# Patient Record
Sex: FEMALE | Race: BLACK OR AFRICAN AMERICAN | NOT HISPANIC OR LATINO | URBAN - METROPOLITAN AREA
[De-identification: names, ages, dates, MRNs, and addresses within clinical notes are randomized per-mention and may not be internally consistent; named-entity substitution may affect disease eponyms.]

---
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
## 2021-05-28 ENCOUNTER — RECORDS - HEALTHEAST (OUTPATIENT)
Dept: ADMINISTRATIVE | Facility: CLINIC | Age: 21
End: 2021-05-28

## 2022-06-29 ENCOUNTER — HOSPITAL ENCOUNTER (EMERGENCY)
Facility: CLINIC | Age: 22
Discharge: HOME | End: 2022-06-29
Attending: EMERGENCY MEDICINE | Admitting: EMERGENCY MEDICINE
Payer: COMMERCIAL

## 2022-06-29 ENCOUNTER — TELEPHONE (OUTPATIENT)
Dept: FAMILY MEDICINE | Facility: CLINIC | Age: 22
End: 2022-06-29

## 2022-06-29 VITALS
HEART RATE: 110 BPM | RESPIRATION RATE: 16 BRPM | TEMPERATURE: 98.2 F | DIASTOLIC BLOOD PRESSURE: 86 MMHG | SYSTOLIC BLOOD PRESSURE: 129 MMHG | OXYGEN SATURATION: 100 %

## 2022-06-29 DIAGNOSIS — U07.1 INFECTION DUE TO 2019 NOVEL CORONAVIRUS: ICD-10-CM

## 2022-06-29 LAB
FLUAV RNA SPEC QL NAA+PROBE: NEGATIVE
FLUBV RNA RESP QL NAA+PROBE: NEGATIVE
RSV RNA SPEC NAA+PROBE: NEGATIVE
SARS-COV-2 RNA RESP QL NAA+PROBE: POSITIVE

## 2022-06-29 PROCEDURE — 87637 SARSCOV2&INF A&B&RSV AMP PRB: CPT | Performed by: EMERGENCY MEDICINE

## 2022-06-29 PROCEDURE — 99283 EMERGENCY DEPT VISIT LOW MDM: CPT

## 2022-06-29 PROCEDURE — C9803 HOPD COVID-19 SPEC COLLECT: HCPCS

## 2022-06-29 RX ORDER — ONDANSETRON 4 MG/1
4 TABLET, ORALLY DISINTEGRATING ORAL EVERY 8 HOURS PRN
Qty: 10 TABLET | Refills: 0 | Status: SHIPPED | OUTPATIENT
Start: 2022-06-29 | End: 2022-07-02

## 2022-06-29 NOTE — DISCHARGE INSTRUCTIONS
Expect to feel achy for the next few days.  Take Tylenol every 4 hours help with symptoms.  Take Zofran to help with any nausea.  Isolate for 5 more days to avoid spreading the infection to others to the

## 2022-06-29 NOTE — ED TRIAGE NOTES
Pt presents with cough, headache and sore throat since Monday. ABCs intact.      Triage Assessment     Row Name 06/29/22 1314       Triage Assessment (Adult)    Airway WDL WDL       Respiratory WDL    Respiratory WDL WDL       Skin Circulation/Temperature WDL    Skin Circulation/Temperature WDL WDL       Cardiac WDL    Cardiac WDL WDL       Peripheral/Neurovascular WDL    Peripheral Neurovascular WDL WDL       Cognitive/Neuro/Behavioral WDL    Cognitive/Neuro/Behavioral WDL WDL

## 2022-06-29 NOTE — TELEPHONE ENCOUNTER
Reason for call:  Other   Patient called regarding (reason for call):   ED followup Covid positive    Additional comments:   Patient looking for a telephone ED follow up / establish care urgent 3-5 days. Either [place] location is fine. Please call patient to discuss.    Phone number to reach patient:  Cell number on file:    Telephone Information:   Mobile [phone]       Best Time:  any    Can we leave a detailed message on this number?  YES    Travel screening: Not Applicable

## 2022-06-29 NOTE — ED PROVIDER NOTES
EMERGENCY DEPARTMENT ENCOUNTER      NAME: Matt Olguin  AGE: 22 year old female  YOB: 2000  MRN: [id number]  EVALUATION DATE & TIME: 6/29/2022  2:40 PM    PCP: No Ref-Primary, Physician    ED PROVIDER: Thanh Farrell M.D.      Chief Complaint   Patient presents with     Cough     Pharyngitis         FINAL IMPRESSION:  1. Infection due to 2019 novel coronavirus          ED COURSE & MEDICAL DECISION MAKING:    Pertinent Labs & Imaging studies reviewed. (See chart for details)  22 year old female presents to the Emergency Department for evaluation of cough, general malaise.  Patient started feeling ill on Monday with achiness and discomfort in her back.  Soon thereafter started having a cough and lightheadedness.  Patient is unvaccinated for COVID.  Patient without obvious shortness of breath.  Lungs clear.  Cardiac exam unremarkable/borderline tachycardia.  Manger of exam reassuring.  Patient did test positive for COVID here.  Given her age and absence of risk factors, patient should have a fairly unremarkable course.  Recommendations are for Tylenol for continued aches and pains.  Zofran provided for nausea.  Also encouraged to self isolate for an additional 5 days to avoid spread to others.  Patient appears non toxic with stable vitals signs. Overall exam is benign.        2:53 PM I met with the patient for the initial interview and physical examination. Discussed plan for treatment and workup in the ED.    3:00 PM Patient ready for discharge.    At the conclusion of the encounter I discussed the results of all of the tests and the disposition. The questions were answered and return precautions provided. The patient or family acknowledged understanding and was agreeable with the care plan.       PPE: Provider wore gloves, N95 mask, eye protection, surgical cap, and gown.     MEDICATIONS GIVEN IN THE EMERGENCY:  Medications - No data to display    NEW PRESCRIPTIONS STARTED AT TODAY'S ER VISIT  New  Prescriptions    ONDANSETRON (ZOFRAN ODT) 4 MG ODT TAB    Take 1 tablet (4 mg) by mouth every 8 hours as needed for nausea          =================================================================    HPI    Patient information was obtained from: Patient    Use of Intrepreter: N/A         Matt SHAYY Olguin is a 22 year old female with no pertient medical history who presents to the ED for evaluation of cough and pharyngitis.    Patient reports she started feeling achy on Monday with discomfort in her back, a scratchy throat, and a cough. She endorses a headache and general body aches. Patient notes she is unvaccinated against Covid. Patient denies any obvious fevers or any other current complaints.    Of note: Patient tested positive for Covid today.      REVIEW OF SYSTEMS   Constitutional:  Denies fever, chills. Positive for general body aches.  HENT: Positive for scratchy throat.  Respiratory: Positive for a cough.  Cardiovascular:  Denies chest pain, palpitations  GI:  Denies abdominal pain, nausea, vomiting, or change in bowel or bladder habits   Musculoskeletal:  Positive for back pain.  Skin:  Denies rash   Neurologic:  Denies focal weakness. Positive for headache.  All systems negative except as marked.     PAST MEDICAL HISTORY:  No past medical history on file.    PAST SURGICAL HISTORY:  No past surgical history on file.      CURRENT MEDICATIONS:    No current facility-administered medications for this encounter.    Current Outpatient Medications:      ondansetron (ZOFRAN ODT) 4 MG ODT tab, Take 1 tablet (4 mg) by mouth every 8 hours as needed for nausea, Disp: 10 tablet, Rfl: 0    ALLERGIES:  No Known Allergies    FAMILY HISTORY:  No family history on file.    SOCIAL HISTORY:   Social History     Socioeconomic History     Marital status: Single   Tobacco Use     Smoking status: Never Smoker     Smokeless tobacco: Never Used       VITALS:  Patient Vitals for the past 24 hrs:   BP Temp Temp src Pulse Resp  SpO2   06/29/22 1316 -- 98.2  F (36.8  C) Temporal -- -- --   06/29/22 1315 129/86 -- -- 110 16 100 %        PHYSICAL EXAM    Constitutional:  Awake, alert, in mild distress  HENT:  Normocephalic, Atraumatic. Bilateral external ears normal. Oropharynx moist. Nose normal. Neck- Normal range of motion with no guarding, No midline cervical tenderness, Supple, No stridor.   Eyes:  PERRL, EOMI with no signs of entrapment, Conjunctiva normal, No discharge.   Respiratory:  Normal breath sounds, No respiratory distress, No wheezing.    Cardiovascular:  Normal rhythm, No appreciable rubs or gallops. Minimally tachycardic.  GI:  Soft, No tenderness, No distension, No palpable masses  Musculoskeletal:  Good range of motion in all major joints.   Integument:  Warm, Dry, No erythema, No rash.   Neurologic:  Alert & oriented, Normal motor function, Normal sensory function, No focal deficits noted.   Psychiatric:  Affect normal, Judgment normal, Mood normal.       LAB:  All pertinent labs reviewed and interpreted.  Results for orders placed or performed during the hospital encounter of 06/29/22   Symptomatic; Yes; 6/27/2022 Influenza A/B & SARS-CoV2 (COVID-19) Virus PCR Multiplex Nasopharyngeal    Specimen: Nasopharyngeal; Swab   Result Value Ref Range    Influenza A PCR Negative Negative    Influenza B PCR Negative Negative    RSV PCR Negative Negative    SARS CoV2 PCR Positive (A) Negative           I, Andie Villasenor, am serving as a scribe to document services personally performed by Thanh Farrell MD, based on my observation and the provider's statements to me. I, Thanh Farrell MD attest that Andie Villasenor is acting in a scribe capacity, has observed my performance of the services and has documented them in accordance with my direction.    Thanh Farrell M.D.  Emergency Medicine  [facility] EMERGENCY ROOM     Thanh Farrell MD  06/29/22 4521

## 2022-07-05 NOTE — TELEPHONE ENCOUNTER
Called and left message for Pt informing her that we can first schedule the ED follow up with [facility] Orte[facility] as he is helping out with them and get an EST care at a later time as they are booking out

## 2022-10-31 ENCOUNTER — OFFICE VISIT (OUTPATIENT)
Dept: FAMILY MEDICINE | Facility: CLINIC | Age: 22
End: 2022-10-31
Payer: COMMERCIAL

## 2022-10-31 VITALS
HEART RATE: 88 BPM | BODY MASS INDEX: 20.83 KG/M2 | WEIGHT: 122 LBS | TEMPERATURE: 98.2 F | SYSTOLIC BLOOD PRESSURE: 115 MMHG | DIASTOLIC BLOOD PRESSURE: 74 MMHG | HEIGHT: 64 IN | OXYGEN SATURATION: 99 %

## 2022-10-31 DIAGNOSIS — R63.0 ANOREXIA: Primary | ICD-10-CM

## 2022-10-31 DIAGNOSIS — D23.9 DERMATOFIBROMA: ICD-10-CM

## 2022-10-31 DIAGNOSIS — K59.00 CONSTIPATION, UNSPECIFIED CONSTIPATION TYPE: ICD-10-CM

## 2022-10-31 DIAGNOSIS — R11.0 NAUSEA: ICD-10-CM

## 2022-10-31 PROCEDURE — 99213 OFFICE O/P EST LOW 20 MIN: CPT | Performed by: FAMILY MEDICINE

## 2022-10-31 RX ORDER — POLYETHYLENE GLYCOL 3350 17 G/17G
1 POWDER, FOR SOLUTION ORAL DAILY
Qty: 238 G | Refills: 1 | Status: SHIPPED | OUTPATIENT
Start: 2022-10-31

## 2022-10-31 NOTE — PROGRESS NOTES
Assessment & Plan     Anorexia  May be H Pylori - will check (and mom known to have). If her H Pylori is negative, would do [id number] or PPI trial in case has mild gastritis. If then not better, will need to work with nutrition on how to increase her intake. Is keeping her weight stable.     Nausea  - Helicobacter pylori Antigen Stool; Future  - Helicobacter pylori Antigen Stool    Constipation, unspecified constipation type  May be contributing. So will treat this as well.   - polyethylene glycol (MIRALAX) 17 GM/Dose powder; Take 17 g (1 capful) by mouth daily    Dermatofibroma  wanting removed. Was referred in the past and did not keep appointment.    - Procedure Clinic - [facility] Internal Referral; Future      I spent a total of 27 minutes on the day of the visit.   Time spent doing chart review, history and exam, documentation and further activities per the note               No follow-ups on file.    Valentine Romano MD  [facility] SOPHIA Gutierrez is a 22 year old, presenting for the following health issues:  Abdominal Pain (Pt reports she has been having decreased appetite and a hard time keeping food down since January. Pt states she had difficulty going to the bathroom and saw blood in stool a few times. Pt reports she feels nauseous and a bit of pressure after eating.)      HPI     Has a history of dermatofibroma and hoping to schedule that removal.     Stomach:  Since 1/2022 has been having trouble eating - is barely eating once a day because when she does eat, her stomach makes loud noises and she feels nauseated. No vomiting. Just does not feel like there is room. Has tried to eat very small meals and unable to do so.   Can go days without eating.   Her weight has stayed the same - has always been at 120 to 124 but in the beginning of the year did get to 115 and then started drinking protein shakes and able to get it up to 120.   Does try to eat protein shakes at time  Does  "eat with friends and then force feeds herself.  But still sick after a couple of bites.    Drinks a lot water. And one [employer] drink.   Goes to [employer] - going well. Is supposed to graduate this year but missed the last semester due to personal reasons.  Did not time with her eating changes. Lots going on so could not focus on her school. This was before food became an issue.     FHx:   Denies H Minal in the family although her mom had it and never took meds for it.      Wt Readings from Last 4 Encounters:   10/31/22 55.3 kg (122 lb)   11/30/20 56.9 kg (125 lb 6.4 oz) (45 %, Z= -0.13)*   09/08/16 54.4 kg (120 lb) (55 %, Z= 0.13)*   03/27/15 48.3 kg (106 lb 6.4 oz) (44 %, Z= -0.16)*     * Growth percentiles are based on [facility] (Girls, 2-20 Years) data.     ROS:  Used to have daily BM but now it is little fatuma that are very painful - about twice a week.    Has had bloody BM in the past.  Three times. Toilet water was red. Painful to have the BM.  In the last year.     Vaccines:   Knows they are good for you but she doesn't want them.           Review of Systems         Objective    /74   Pulse 88   Temp 98.2  F (36.8  C) (Oral)   Ht 1.622 m (5' 3.86\")   Wt 55.3 kg (122 lb)   SpO2 99%   BMI 21.03 kg/m    Body mass index is 21.03 kg/m .  Physical Exam   GENERAL: healthy, alert and no distress  RESP: lungs clear to auscultation - no rales, rhonchi or wheezes  CV: regular rate and rhythm, normal S1 S2, no S3 or S4, no murmur, click or rub, no peripheral edema and peripheral pulses strong  ABDOMEN: soft, nontender, no hepatosplenomegaly, no masses and bowel sounds normal  MS: no gross musculoskeletal defects noted, no edema                    "

## 2022-10-31 NOTE — PATIENT INSTRUCTIONS
Patient Education   Here is the plan from today's visit    1. Constipation, unspecified constipation type  Take the Miralax daily   - polyethylene glycol (MIRALAX) 17 GM/Dose powder; Take 17 g (1 capful) by mouth daily  Dispense: 238 g; Refill: 1    2. Nausea  If negative - I will send in a prescription for Pepcid to take twice a day.  If that does not work, I will refer you to a nutritionist  If positive - I will tell you to come in.    - Helicobacter pylori Antigen Stool; Future        Please call or return to clinic if your symptoms don't go away.    Follow up plan  No follow-ups on file.    Thank you for coming to [facility] today.  Lab Testing:  **If you had lab testing today and your results are reassuring or normal they will be mailed to you or sent through [employer] within 7 days.   **If the lab tests need quick action we will call you with the results.  **If you are having labs done on a different day, please call [phone] to schedule at [facility] or [phone] for other [facility] Outpatient Lab locations. Labs do not offer walk-in appointments.  The phone number we will call with results is # [phone] (home) . If this is not the best number please call our clinic and change the number.  Medication Refills:  If you need any refills please call your pharmacy and they will contact us.   If you need to  your refill at a new pharmacy, please contact the new pharmacy directly. The new pharmacy will help you get your medications transferred faster.   Scheduling:  If you have any concerns about today's visit or wish to schedule another appointment please call our office during normal business hours [phone] (8-5:00 M-F)  If a referral was made to an [facility] specialty provider and you do not get a call from central scheduling, please refer to directions on your visit summary or call our office during normal business hours for assistance.   If a Mammogram was ordered for  you at [facility] call [phone] to schedule or change your appointment.  If you had an XRay/CT/Ultrasound/MRI ordered the number is [phone] to schedule or change your radiology appointment.   [facility] has limited ultrasound appointments available on Wednesdays, if you would like your ultrasound at [facility], please call [phone] to schedule.   Medical Concerns:  If you have urgent medical concerns please call [phone] at any time of the day.    Valentine Romano MD

## 2022-11-21 ENCOUNTER — HEALTH MAINTENANCE LETTER (OUTPATIENT)
Age: 22
End: 2022-11-21

## 2023-11-26 ENCOUNTER — HEALTH MAINTENANCE LETTER (OUTPATIENT)
Age: 23
End: 2023-11-26

## 2025-01-04 ENCOUNTER — HEALTH MAINTENANCE LETTER (OUTPATIENT)
Age: 25
End: 2025-01-04